# Patient Record
Sex: MALE | Race: OTHER | HISPANIC OR LATINO | URBAN - METROPOLITAN AREA
[De-identification: names, ages, dates, MRNs, and addresses within clinical notes are randomized per-mention and may not be internally consistent; named-entity substitution may affect disease eponyms.]

---

## 2019-12-11 ENCOUNTER — EMERGENCY (EMERGENCY)
Facility: HOSPITAL | Age: 29
LOS: 0 days | Discharge: HOME | End: 2019-12-11
Attending: EMERGENCY MEDICINE
Payer: MEDICAID

## 2019-12-11 VITALS — HEIGHT: 66.93 IN | WEIGHT: 175.05 LBS

## 2019-12-11 VITALS
HEART RATE: 59 BPM | DIASTOLIC BLOOD PRESSURE: 79 MMHG | SYSTOLIC BLOOD PRESSURE: 119 MMHG | OXYGEN SATURATION: 100 % | RESPIRATION RATE: 18 BRPM | TEMPERATURE: 98 F

## 2019-12-11 DIAGNOSIS — R51 HEADACHE: ICD-10-CM

## 2019-12-11 DIAGNOSIS — Z87.891 PERSONAL HISTORY OF NICOTINE DEPENDENCE: ICD-10-CM

## 2019-12-11 LAB
ALBUMIN SERPL ELPH-MCNC: 4.1 G/DL — SIGNIFICANT CHANGE UP (ref 3.3–5)
ALP SERPL-CCNC: 89 U/L — SIGNIFICANT CHANGE UP (ref 40–120)
ALT FLD-CCNC: 39 U/L — SIGNIFICANT CHANGE UP (ref 12–78)
ANION GAP SERPL CALC-SCNC: 4 MMOL/L — LOW (ref 5–17)
AST SERPL-CCNC: 30 U/L — SIGNIFICANT CHANGE UP (ref 15–37)
BASOPHILS # BLD AUTO: 0.03 K/UL — SIGNIFICANT CHANGE UP (ref 0–0.2)
BASOPHILS NFR BLD AUTO: 0.3 % — SIGNIFICANT CHANGE UP (ref 0–2)
BILIRUB SERPL-MCNC: 0.4 MG/DL — SIGNIFICANT CHANGE UP (ref 0.2–1.2)
BUN SERPL-MCNC: 8 MG/DL — SIGNIFICANT CHANGE UP (ref 7–23)
CALCIUM SERPL-MCNC: 9.6 MG/DL — SIGNIFICANT CHANGE UP (ref 8.5–10.1)
CHLORIDE SERPL-SCNC: 108 MMOL/L — SIGNIFICANT CHANGE UP (ref 96–108)
CO2 SERPL-SCNC: 29 MMOL/L — SIGNIFICANT CHANGE UP (ref 22–31)
CREAT SERPL-MCNC: 0.85 MG/DL — SIGNIFICANT CHANGE UP (ref 0.5–1.3)
EOSINOPHIL # BLD AUTO: 0.19 K/UL — SIGNIFICANT CHANGE UP (ref 0–0.5)
EOSINOPHIL NFR BLD AUTO: 2.1 % — SIGNIFICANT CHANGE UP (ref 0–6)
GLUCOSE SERPL-MCNC: 98 MG/DL — SIGNIFICANT CHANGE UP (ref 70–99)
HCT VFR BLD CALC: 44.5 % — SIGNIFICANT CHANGE UP (ref 39–50)
HGB BLD-MCNC: 14.7 G/DL — SIGNIFICANT CHANGE UP (ref 13–17)
IMM GRANULOCYTES NFR BLD AUTO: 0.2 % — SIGNIFICANT CHANGE UP (ref 0–1.5)
LYMPHOCYTES # BLD AUTO: 2.66 K/UL — SIGNIFICANT CHANGE UP (ref 1–3.3)
LYMPHOCYTES # BLD AUTO: 29.6 % — SIGNIFICANT CHANGE UP (ref 13–44)
MCHC RBC-ENTMCNC: 29 PG — SIGNIFICANT CHANGE UP (ref 27–34)
MCHC RBC-ENTMCNC: 33 GM/DL — SIGNIFICANT CHANGE UP (ref 32–36)
MCV RBC AUTO: 87.8 FL — SIGNIFICANT CHANGE UP (ref 80–100)
MONOCYTES # BLD AUTO: 0.66 K/UL — SIGNIFICANT CHANGE UP (ref 0–0.9)
MONOCYTES NFR BLD AUTO: 7.3 % — SIGNIFICANT CHANGE UP (ref 2–14)
NEUTROPHILS # BLD AUTO: 5.44 K/UL — SIGNIFICANT CHANGE UP (ref 1.8–7.4)
NEUTROPHILS NFR BLD AUTO: 60.5 % — SIGNIFICANT CHANGE UP (ref 43–77)
PLATELET # BLD AUTO: 339 K/UL — SIGNIFICANT CHANGE UP (ref 150–400)
POTASSIUM SERPL-MCNC: 4.1 MMOL/L — SIGNIFICANT CHANGE UP (ref 3.5–5.3)
POTASSIUM SERPL-SCNC: 4.1 MMOL/L — SIGNIFICANT CHANGE UP (ref 3.5–5.3)
PROT SERPL-MCNC: 8.2 GM/DL — SIGNIFICANT CHANGE UP (ref 6–8.3)
RBC # BLD: 5.07 M/UL — SIGNIFICANT CHANGE UP (ref 4.2–5.8)
RBC # FLD: 13.7 % — SIGNIFICANT CHANGE UP (ref 10.3–14.5)
SODIUM SERPL-SCNC: 141 MMOL/L — SIGNIFICANT CHANGE UP (ref 135–145)
WBC # BLD: 9 K/UL — SIGNIFICANT CHANGE UP (ref 3.8–10.5)
WBC # FLD AUTO: 9 K/UL — SIGNIFICANT CHANGE UP (ref 3.8–10.5)

## 2019-12-11 PROCEDURE — 99283 EMERGENCY DEPT VISIT LOW MDM: CPT

## 2019-12-11 PROCEDURE — 99284 EMERGENCY DEPT VISIT MOD MDM: CPT | Mod: 25

## 2019-12-11 PROCEDURE — 96374 THER/PROPH/DIAG INJ IV PUSH: CPT

## 2019-12-11 PROCEDURE — 36415 COLL VENOUS BLD VENIPUNCTURE: CPT

## 2019-12-11 PROCEDURE — 70450 CT HEAD/BRAIN W/O DYE: CPT | Mod: 26

## 2019-12-11 PROCEDURE — 70450 CT HEAD/BRAIN W/O DYE: CPT

## 2019-12-11 PROCEDURE — 96361 HYDRATE IV INFUSION ADD-ON: CPT

## 2019-12-11 PROCEDURE — 80053 COMPREHEN METABOLIC PANEL: CPT

## 2019-12-11 PROCEDURE — 85025 COMPLETE CBC W/AUTO DIFF WBC: CPT

## 2019-12-11 RX ORDER — ACETAMINOPHEN 500 MG
650 TABLET ORAL ONCE
Refills: 0 | Status: COMPLETED | OUTPATIENT
Start: 2019-12-11 | End: 2019-12-11

## 2019-12-11 RX ORDER — METOCLOPRAMIDE HCL 10 MG
10 TABLET ORAL ONCE
Refills: 0 | Status: COMPLETED | OUTPATIENT
Start: 2019-12-11 | End: 2019-12-11

## 2019-12-11 RX ORDER — ACETAMINOPHEN 500 MG
2 TABLET ORAL
Qty: 60 | Refills: 0
Start: 2019-12-11

## 2019-12-11 RX ORDER — SODIUM CHLORIDE 9 MG/ML
1000 INJECTION INTRAMUSCULAR; INTRAVENOUS; SUBCUTANEOUS ONCE
Refills: 0 | Status: COMPLETED | OUTPATIENT
Start: 2019-12-11 | End: 2019-12-11

## 2019-12-11 RX ADMIN — SODIUM CHLORIDE 1000 MILLILITER(S): 9 INJECTION INTRAMUSCULAR; INTRAVENOUS; SUBCUTANEOUS at 14:21

## 2019-12-11 RX ADMIN — SODIUM CHLORIDE 1000 MILLILITER(S): 9 INJECTION INTRAMUSCULAR; INTRAVENOUS; SUBCUTANEOUS at 13:21

## 2019-12-11 RX ADMIN — Medication 10 MILLIGRAM(S): at 13:21

## 2019-12-11 RX ADMIN — Medication 650 MILLIGRAM(S): at 14:06

## 2019-12-11 NOTE — ED STATDOCS - OBJECTIVE STATEMENT
30 y/o M with no PMHx presents to the ED c/o a HA for the past 15 days; worsening in severity. Pt took Advil yesterday with temporary relief in symptoms. States that HA is located on the R side of his head. Notes that when he quickly stood up today, his vision went blurry before resolving quickly. Pt has not had similar symptoms in the past and has never seen an MD for any prior HA's. +former smoker; stopped ~3 months ago. +former EtOH use; stopped ~3/4 months ago. Denies photophobia, N/V/D, injury, trauma, fever, or illicit drug use. Pt is Wolof speaking; int #: [id number]. NKDA.

## 2019-12-11 NOTE — ED STATDOCS - CLINICAL SUMMARY MEDICAL DECISION MAKING FREE TEXT BOX
symptom control, images symptom control, images. Labs and imaging unremarkable, plan for d/c with PCP referral.

## 2019-12-11 NOTE — ED STATDOCS - NSFOLLOWUPINSTRUCTIONS_ED_ALL_ED_FT
POR FAVOR USE TYLENOL CADA 6 HORAS SEGÚN SE NECESITA PARA EL DOLOR DE JOSE L. SEGUIMIENTO [facility] PARA ATENCIÓN ADICIONAL. REGRESAR A LA TIFFANY DE EMERGENCIA SI LOS SÍNTOMAS SUCEDEN.    Cefalea tensional en los adultos  Tension Headache, Adult  La cefalea tensional es un dolor o gillian presión que se siente en la jose l. Las cefaleas tensionales pueden durar de 30 minutos a varios días.  Siga estas indicaciones en palafox casa:  Control del dolor     [place] los medicamentos de venta julio y los recetados solamente juancarlos se lo haya indicado el médico.Cuando tenga gillian cefalea, acuéstese en gillian habitación oscura y silenciosa.Si se lo indican, aplíquese hielo en la jose l y el silvia:  Ponga el hielo en gillian bolsa plástica.Coloque gillian toalla entre la piel y la bolsa de hielo.Coloque el hielo nabeel 20 minutos, 2 o 3 veces por día.Si se lo indican, aplíquese calor en la nuca. Hágalo con la frecuencia que le haya dicho el médico. Use el tipo de calor que el médico le recomiende, juancarlos gillian compresa de calor húmedo o gillian almohadilla térmica.  Coloque gillian toalla entre la piel y la parviz de calor.Aplique el calor nabeel 20 a 30 minutos.Retire la parviz de calor si la piel se pone de color rodriguez brillante.Comida y bebida     Mantenga un horario para las comidas.Controle la cantidad de alcohol que telly:  Si es paulina y no está embarazada, no consuma más de 1 bebida por día.Si es hombre, no consuma más de 2 bebidas por día.Evelyn suficiente líquido para mantener el pis (orina) de color amarillo pálido.No consuma mucha cafeína ni deje de consumirla por completo.Estilo de nestor     Duerma lo suficiente. Duerma entre 7 y 9 horas todas las noches. O duerma la cantidad de tiempo que le indique el médico.A la hora de dormir, retire todos los dispositivos electrónicos de palafox habitación. Algunos ejemplos de dispositivos electrónicos son las computadoras, los teléfonos y las tabletas.Encuentre maneras de reducir el estrés. Las siguientes son algunas cosas que pueden reducir el estrés:  Actividad física.Respiración profunda.Yoga.Música.Pensamientos positivos.Siéntese con la espalda recta. No contraiga (tensione) los músculos.No consuma ningún producto que contenga nicotina o tabaco, juancarlos cigarrillos y cigarrillos electrónicos. Si necesita ayuda para dejar de fumar, consulte al médico.Instrucciones generales        Concurra a todas las visitas de control juancarlos se lo haya indicado el médico. [place] es importante.Evite las cosas que puedan provocar las cefaleas. Lleve un registro diario para averiguar si ciertas cosas provocan los scarlet de jose l. Registre, por ejemplo, lo siguiente:  Lo que usted come y telly.Cuánto tiempo duerme.Algún cambio en palafox dieta o en los medicamentos.Comuníquese con un médico si:  La cefalea no se kelsy.La cefalea regresa.Tiene gillian cefalea y le molestan los sonidos, la magnolia o los olores.Tiene malestar estomacal (náuseas) o vomita.Le duele el estómago.Solicite ayuda de inmediato si:  Siente un dolor de jose l muy intenso de repente junto con alguno de estos síntomas:  Rigidez en el silvia.Ganas de vomitar.Vómitos.Sensación de debilidad.Dificultad para mello.Falta de aire.Erupción cutánea.Somnolencia inusual.Dificultad para hablar.Dolor en el madonna o el oído.Dificultad para caminar o mantener el equilibrio.Sensación de que se desvanecerá (se desmayará).Desmayo.Resumen  La cefalea tensional es un dolor o gillian presión que se siente en la jose l.Las cefaleas tensionales pueden durar de 30 minutos a varios días.Los cambios en el estilo de nestor y los medicamentos pueden ayudar a aliviar el dolor.Esta información no tiene juancarlos fin reemplazar el consejo del médico. Asegúrese de hacerle al médico cualquier pregunta que tenga.

## 2019-12-11 NOTE — ED STATDOCS - PATIENT PORTAL LINK FT
You can access the FollowMyHealth Patient Portal offered by [facility] by registering at the following website: [email or web address]. By joining [employer]’s FollowMyHealth portal, you will also be able to view your health information using other applications (apps) compatible with our system.

## 2019-12-11 NOTE — ED STATDOCS - PROGRESS NOTE DETAILS
History with assistance from  ID# [id number]. 30 y/o M with no PMH presents with HA x 2 weeks. States he took advil yesterday with temporary improvement in symptoms. HA is frontal and right sided. States he's had similar HA in the past, but never been evaluated. Denies head trauma, photophobia, nausae, vomiting, drug use. PE: Well appearing. Cardiac; s1s2, RRR. lungs: CTAB. HEENT: PERRLA, EOMI. Abdomen; NBS x4, soft, nontender. Neuro: CN II-XII intact. Sensation intact to light touch in all extremities. 5/5 strength in all extremities. A/P: likely migraine, plan for analgesia, labs, head CT, reassess. - Babatunde Zaldivar PA-C Patient reports improvement in HA. Will d/c home and provided PCP referral. - Babatunde Zaldivar PA-C

## 2019-12-11 NOTE — ED ADULT NURSE NOTE - OBJECTIVE STATEMENT
pt a/ox3, c/o headache, pt reports pain began x15 days ago.pt reports pain has worsened over the last few days. pt reports taking advil for pain relief yesterday, reports partial relief. pt reports  pain remains in right side of head. pt denies vision changes at this time, reports blurred vision yesterday. pt reports blurred vision subsided after minutes. pt reports quitting smoking x2-3 months ago. pt reports former ETOH use. pt denies SOB, CP, N/V/D, fever, chills.

## 2019-12-11 NOTE — ED ADULT TRIAGE NOTE - CHIEF COMPLAINT QUOTE
pt presents to ED c/o headache x 15 days getting worse. denies vision changes/blurry vision/any trauma or injury

## 2019-12-11 NOTE — ED STATDOCS - NSFOLLOWUPCLINICS_GEN_ALL_ED_FT
[facility]  Family Medicine  [address] [place] [address]  Phone: [phone]  Fax:   Follow Up Time:

## 2019-12-29 ENCOUNTER — EMERGENCY (EMERGENCY)
Facility: HOSPITAL | Age: 29
LOS: 0 days | Discharge: HOME | End: 2019-12-29
Attending: EMERGENCY MEDICINE
Payer: MEDICAID

## 2019-12-29 VITALS — WEIGHT: 149.91 LBS | HEIGHT: 68 IN

## 2019-12-29 VITALS
OXYGEN SATURATION: 99 % | HEART RATE: 66 BPM | RESPIRATION RATE: 17 BRPM | DIASTOLIC BLOOD PRESSURE: 66 MMHG | TEMPERATURE: 98 F | SYSTOLIC BLOOD PRESSURE: 120 MMHG

## 2019-12-29 DIAGNOSIS — H53.8 OTHER VISUAL DISTURBANCES: ICD-10-CM

## 2019-12-29 DIAGNOSIS — R91.8 OTHER NONSPECIFIC ABNORMAL FINDING OF LUNG FIELD: ICD-10-CM

## 2019-12-29 DIAGNOSIS — R51 HEADACHE: ICD-10-CM

## 2019-12-29 DIAGNOSIS — H49.21 SIXTH [ABDUCENT] NERVE PALSY, RIGHT EYE: ICD-10-CM

## 2019-12-29 PROBLEM — Z78.9 OTHER SPECIFIED HEALTH STATUS: Chronic | Status: ACTIVE | Noted: 2019-12-11

## 2019-12-29 LAB
ALBUMIN SERPL ELPH-MCNC: 4 G/DL — SIGNIFICANT CHANGE UP (ref 3.3–5)
ALP SERPL-CCNC: 99 U/L — SIGNIFICANT CHANGE UP (ref 40–120)
ALT FLD-CCNC: 37 U/L — SIGNIFICANT CHANGE UP (ref 12–78)
ANION GAP SERPL CALC-SCNC: 3 MMOL/L — LOW (ref 5–17)
APTT BLD: 31.3 SEC — SIGNIFICANT CHANGE UP (ref 27.5–36.3)
AST SERPL-CCNC: 23 U/L — SIGNIFICANT CHANGE UP (ref 15–37)
BASOPHILS # BLD AUTO: 0.03 K/UL — SIGNIFICANT CHANGE UP (ref 0–0.2)
BASOPHILS NFR BLD AUTO: 0.3 % — SIGNIFICANT CHANGE UP (ref 0–2)
BILIRUB SERPL-MCNC: 0.4 MG/DL — SIGNIFICANT CHANGE UP (ref 0.2–1.2)
BUN SERPL-MCNC: 8 MG/DL — SIGNIFICANT CHANGE UP (ref 7–23)
CALCIUM SERPL-MCNC: 9 MG/DL — SIGNIFICANT CHANGE UP (ref 8.5–10.1)
CHLORIDE SERPL-SCNC: 109 MMOL/L — HIGH (ref 96–108)
CO2 SERPL-SCNC: 29 MMOL/L — SIGNIFICANT CHANGE UP (ref 22–31)
CREAT SERPL-MCNC: 0.79 MG/DL — SIGNIFICANT CHANGE UP (ref 0.5–1.3)
EOSINOPHIL # BLD AUTO: 0.96 K/UL — HIGH (ref 0–0.5)
EOSINOPHIL NFR BLD AUTO: 11 % — HIGH (ref 0–6)
GLUCOSE SERPL-MCNC: 82 MG/DL — SIGNIFICANT CHANGE UP (ref 70–99)
HCT VFR BLD CALC: 42.3 % — SIGNIFICANT CHANGE UP (ref 39–50)
HGB BLD-MCNC: 14 G/DL — SIGNIFICANT CHANGE UP (ref 13–17)
IMM GRANULOCYTES NFR BLD AUTO: 0.3 % — SIGNIFICANT CHANGE UP (ref 0–1.5)
INR BLD: 1.06 RATIO — SIGNIFICANT CHANGE UP (ref 0.88–1.16)
LYMPHOCYTES # BLD AUTO: 2.86 K/UL — SIGNIFICANT CHANGE UP (ref 1–3.3)
LYMPHOCYTES # BLD AUTO: 32.9 % — SIGNIFICANT CHANGE UP (ref 13–44)
MCHC RBC-ENTMCNC: 29.2 PG — SIGNIFICANT CHANGE UP (ref 27–34)
MCHC RBC-ENTMCNC: 33.1 GM/DL — SIGNIFICANT CHANGE UP (ref 32–36)
MCV RBC AUTO: 88.3 FL — SIGNIFICANT CHANGE UP (ref 80–100)
MONOCYTES # BLD AUTO: 0.66 K/UL — SIGNIFICANT CHANGE UP (ref 0–0.9)
MONOCYTES NFR BLD AUTO: 7.6 % — SIGNIFICANT CHANGE UP (ref 2–14)
NEUTROPHILS # BLD AUTO: 4.16 K/UL — SIGNIFICANT CHANGE UP (ref 1.8–7.4)
NEUTROPHILS NFR BLD AUTO: 47.9 % — SIGNIFICANT CHANGE UP (ref 43–77)
PLATELET # BLD AUTO: 295 K/UL — SIGNIFICANT CHANGE UP (ref 150–400)
POTASSIUM SERPL-MCNC: 4.1 MMOL/L — SIGNIFICANT CHANGE UP (ref 3.5–5.3)
POTASSIUM SERPL-SCNC: 4.1 MMOL/L — SIGNIFICANT CHANGE UP (ref 3.5–5.3)
PROT SERPL-MCNC: 8 GM/DL — SIGNIFICANT CHANGE UP (ref 6–8.3)
PROTHROM AB SERPL-ACNC: 11.8 SEC — SIGNIFICANT CHANGE UP (ref 10–12.9)
RBC # BLD: 4.79 M/UL — SIGNIFICANT CHANGE UP (ref 4.2–5.8)
RBC # FLD: 13.3 % — SIGNIFICANT CHANGE UP (ref 10.3–14.5)
SODIUM SERPL-SCNC: 141 MMOL/L — SIGNIFICANT CHANGE UP (ref 135–145)
WBC # BLD: 8.7 K/UL — SIGNIFICANT CHANGE UP (ref 3.8–10.5)
WBC # FLD AUTO: 8.7 K/UL — SIGNIFICANT CHANGE UP (ref 3.8–10.5)

## 2019-12-29 PROCEDURE — 99284 EMERGENCY DEPT VISIT MOD MDM: CPT | Mod: 25

## 2019-12-29 PROCEDURE — 36415 COLL VENOUS BLD VENIPUNCTURE: CPT

## 2019-12-29 PROCEDURE — 96374 THER/PROPH/DIAG INJ IV PUSH: CPT | Mod: XU

## 2019-12-29 PROCEDURE — 85610 PROTHROMBIN TIME: CPT

## 2019-12-29 PROCEDURE — 70498 CT ANGIOGRAPHY NECK: CPT | Mod: 26

## 2019-12-29 PROCEDURE — 93010 ELECTROCARDIOGRAM REPORT: CPT

## 2019-12-29 PROCEDURE — 93005 ELECTROCARDIOGRAM TRACING: CPT

## 2019-12-29 PROCEDURE — 99284 EMERGENCY DEPT VISIT MOD MDM: CPT

## 2019-12-29 PROCEDURE — 80053 COMPREHEN METABOLIC PANEL: CPT

## 2019-12-29 PROCEDURE — 85025 COMPLETE CBC W/AUTO DIFF WBC: CPT

## 2019-12-29 PROCEDURE — 70496 CT ANGIOGRAPHY HEAD: CPT

## 2019-12-29 PROCEDURE — 85730 THROMBOPLASTIN TIME PARTIAL: CPT

## 2019-12-29 PROCEDURE — 71046 X-RAY EXAM CHEST 2 VIEWS: CPT

## 2019-12-29 PROCEDURE — 70496 CT ANGIOGRAPHY HEAD: CPT | Mod: 26

## 2019-12-29 PROCEDURE — 71046 X-RAY EXAM CHEST 2 VIEWS: CPT | Mod: 26

## 2019-12-29 PROCEDURE — 96375 TX/PRO/DX INJ NEW DRUG ADDON: CPT | Mod: XU

## 2019-12-29 PROCEDURE — 70498 CT ANGIOGRAPHY NECK: CPT

## 2019-12-29 RX ORDER — KETOROLAC TROMETHAMINE 30 MG/ML
30 SYRINGE (ML) INJECTION ONCE
Refills: 0 | Status: DISCONTINUED | OUTPATIENT
Start: 2019-12-29 | End: 2019-12-29

## 2019-12-29 RX ORDER — METOCLOPRAMIDE HCL 10 MG
10 TABLET ORAL ONCE
Refills: 0 | Status: COMPLETED | OUTPATIENT
Start: 2019-12-29 | End: 2019-12-29

## 2019-12-29 RX ORDER — ACETAMINOPHEN 500 MG
975 TABLET ORAL ONCE
Refills: 0 | Status: COMPLETED | OUTPATIENT
Start: 2019-12-29 | End: 2019-12-29

## 2019-12-29 RX ADMIN — Medication 60 MILLIGRAM(S): at 14:07

## 2019-12-29 RX ADMIN — Medication 30 MILLIGRAM(S): at 14:34

## 2019-12-29 RX ADMIN — Medication 10 MILLIGRAM(S): at 12:26

## 2019-12-29 RX ADMIN — Medication 975 MILLIGRAM(S): at 12:25

## 2019-12-29 NOTE — ED STATDOCS - NS ED ROS FT
Review of Systems:  	•	CONSTITUTIONAL: no fever  	•	SKIN: no rash  	•	RESPIRATORY: no shortness of breath  	•	CARDIAC: no chest pain, no palpitations  	•	GI:  no abd pain, no nausea, no vomiting, no diarrhea  	•	GENITO-URINARY:  no dysuria; no hematuria    	•	MUSCULOSKELETAL:  no back pain  	•	NEUROLOGIC: headache, blurry vision, no weakness  	•	ALLERGY: no rhinitis  	•	PSYSCHIATRIC: no anxiety

## 2019-12-29 NOTE — ED STATDOCS - PHYSICAL EXAMINATION
*GEN: No acute distress, well appearing; A+O x3   *HEAD: Normocephalic, Atraumatic  *EYES/NOSE: +pterygium B/L, PERRL & EOMI b/l  *THROAT: airway patent, moist mucous membranes  *NECK: Neck supple, no masses  *PULMONARY: CTA b/l, symmetric breath sounds.   *CARDIAC: s1s2, regular rhythm, no Murmur  *ABDOMEN:  Non Tender, Non Distended, soft, no guarding, no rebound, no masses   *BACK: no CVA tenderness, Normal  spine   *EXTREMITIES: symmetric pulses, 2+ dp & radial pulses, capillary refill < 2 seconds, no cyanosis, no edema   *SKIN: no rash or bruising   *NEUROLOGIC: alert, moves all 4 extremities, full active & passive ROM in all extremities, normal baseline gait, CN 2-5 and 7-12 intact. +right 6th cranial nerve palsy, normal finger to nose & heel to shin; no dysdiadochokinesis; equal & normal strength & sensation in b/l UE/LE; full active & passive ROM in all extremities,  no pronator drift, normal patellar reflex, normal gait, romberg sign negative   *PSYCH: insight and judgment nl, memory nl, affect nl, thought nl

## 2019-12-29 NOTE — ED STATDOCS - PATIENT PORTAL LINK FT
You can access the FollowMyHealth Patient Portal offered by [facility] by registering at the following website: [email or web address]. By joining [employer]’s FollowMyHealth portal, you will also be able to view your health information using other applications (apps) compatible with our system.

## 2019-12-29 NOTE — ED STATDOCS - CARE PROVIDERS DIRECT ADDRESSES
[email or web address]

## 2019-12-29 NOTE — ED STATDOCS - SECONDARY DIAGNOSIS.
Acute nonintractable headache, unspecified headache type Ground glass opacity present on imaging of lung

## 2019-12-29 NOTE — ED ADULT NURSE NOTE - NSIMPLEMENTINTERV_GEN_ALL_ED
Implemented All Universal Safety Interventions:  [place] to call system. Call bell, personal items and telephone within reach. Instruct patient to call for assistance. Room bathroom lighting operational. Non-slip footwear when patient is off stretcher. Physically safe environment: no spills, clutter or unnecessary equipment. Stretcher in lowest position, wheels locked, appropriate side rails in place.

## 2019-12-29 NOTE — ED ADULT TRIAGE NOTE - CHIEF COMPLAINT QUOTE
Patient comes in with headache x 2 months. Patient was seen in ED for same symptoms 11 days ago. Patient states he has increased photophobia x 3 days. No neuro deficits noted. No signs of acute distress noted.

## 2019-12-29 NOTE — ED STATDOCS - PROGRESS NOTE DETAILS
Patient seen and evaluated, ED attending note and orders reviewed, will continue with patient follow up and care -Kacie Hatfield PA-C labs WNL, CTA with     1.   Right carotid system:  No hemodynamically significant stenosis. 2.   Left carotid system:  No hemodynamically significant stenosis 3.   Intracranial circulation:  No hemodynamically significant stenosis. 4.   Brain:  No acute infarct or hemorrhage.  No aneurysm.   Diffuse groundglass opacities throughout the right upper lobe in the medial aspect of the right middle lobe. There is diffuse interstitial thickening throughout the bilateral lungs. Tiny right pleural effusion is present. Spoke to pt using ALEX Matamoros as , pt denied any cough, hemoptysis or respiratory sx, advised pt of results and advised f/u with [facility] for TB testing, strict return precautions given.  Pt with CN 6 palsy, explained to pt, likely idiopathic as pt as no known hx of DM and glucose is WNL on BMP, explained it can last months, pt verbalized understanding, advised neuro f/u, will obtain CXR, if normal will d/c home with steroids  Kacie Hatfield PA-C CXR WNL, pt still with mild HA will give toradol and d/c home, stressed importance of f/u with both neuro and [facility] for TB testing, pt agreeable to d/c and plan of care, all questions answered, return precautions given  Kacie Hatfield PA-C

## 2019-12-29 NOTE — ED STATDOCS - CARE PROVIDER_API CALL
Sandra Artis)  Neurology  General  [address] [place] [address], [address] [place] [address]  Phone: [phone]  Fax: [phone]  Follow Up Time:     Ashkan Will)  Internal Medicine; Neurology  [address] [place] [address], [place] [address]  Phone: [phone]  Fax: [phone]  Follow Up Time:

## 2019-12-29 NOTE — ED STATDOCS - CLINICAL SUMMARY MEDICAL DECISION MAKING FREE TEXT BOX
HA with blurry visual and 6th nerve cranial palsy will get CTA head and neck. HA with blurry visual and 6th nerve cranial palsy will get CTA head and neck.  CTA negative, likely CN IV palsy, will d/c home with outpt f/u with neuro and PMD HA with blurry visual and 6th nerve cranial palsy will get CTA head and neck.  CTA negative, likely CN VI palsy, will d/c home with outpt f/u with neuro and PMD HA with blurry visual and 6th nerve cranial palsy will get CTA head and neck.  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

## 2019-12-29 NOTE — ED STATDOCS - OBJECTIVE STATEMENT
Pertinent HPI/PMH/PSH/FHx/SHx and Review of Systems contained within  HPI: 30 yo M p/w CC HA x 2 months. Montenegrin translation via pacific , ID # . Pt also with blurry vision when looking to right. Took Tylenol with no relief. Took no other meds for symptoms. NKDA.  PMH/PSH relevant for: No PMHx, no PSHx.   ROS negative for: fever, Chest pain, SOB, Nausea, vomiting, diarrhea, abdominal pain, dysuria    FamilyHx and SocialHx not otherwise contributory Pertinent HPI/PMH/PSH/FHx/SHx and Review of Systems contained within  HPI: 28 yo M p/w CC HA x 2 months. . Pt also with blurry vision out of R eye only when looking to right. Took Tylenol with no relief. Took no other meds for symptoms. NKDA.  PMH/PSH relevant for: No PMHx, no PSHx.   ROS negative for: fever, Chest pain, SOB, Nausea, vomiting, diarrhea, abdominal pain, dysuria    FamilyHx and SocialHx not otherwise contributory

## 2019-12-29 NOTE — ED STATDOCS - CARE PLAN
Principal Discharge DX:	Cranial nerve IV palsy, right  Secondary Diagnosis:	Acute nonintractable headache, unspecified headache type  Secondary Diagnosis:	Ground glass opacity present on imaging of lung Principal Discharge DX:	Cranial nerve VI palsy, right  Secondary Diagnosis:	Acute nonintractable headache, unspecified headache type  Secondary Diagnosis:	Ground glass opacity present on imaging of lung

## 2019-12-29 NOTE — ED ADULT NURSE NOTE - OBJECTIVE STATEMENT
c/o R sided HA x 2 months with blurred R eye vision when looking to Right. Denies CP, SOB, dizziness, n/v at this time. Denies PMHX

## 2019-12-29 NOTE — ED STATDOCS - ATTENDING CONTRIBUTION TO CARE
I, Flex Aguilar MD,  performed the initial face to face bedside interview with this patient regarding history of present illness, review of symptoms and relevant past medical, social and family history.  I completed an independent physical examination.  I was the initial provider who evaluated this patient. I have signed out the follow up of any pending tests (i.e. labs, radiological studies) to the ACP.  I have communicated the patient’s plan of care and disposition with the ACP.

## 2023-02-20 NOTE — ED ADULT NURSE NOTE - NSFALLRSKASSESASSIST_ED_ALL_ED
You can access the FollowMyHealth Patient Portal offered by [facility] by registering at the following website: [email or web address]. By joining [employer]’s FollowMyHealth portal, you will also be able to view your health information using other applications (apps) compatible with our system. no